# Patient Record
Sex: MALE | URBAN - METROPOLITAN AREA
[De-identification: names, ages, dates, MRNs, and addresses within clinical notes are randomized per-mention and may not be internally consistent; named-entity substitution may affect disease eponyms.]

---

## 2020-02-10 ENCOUNTER — APPOINTMENT (OUTPATIENT)
Age: 70
Setting detail: DERMATOLOGY
End: 2020-02-11

## 2020-02-10 DIAGNOSIS — Z41.9 ENCOUNTER FOR PROCEDURE FOR PURPOSES OTHER THAN REMEDYING HEALTH STATE, UNSPECIFIED: ICD-10-CM

## 2020-02-10 PROCEDURE — OTHER FILLERS (CC): OTHER

## 2020-02-10 NOTE — PROCEDURE: FILLERS (CC)
Price (Use Numbers Only, No Special Characters Or $): 581 Price (Use Numbers Only, No Special Characters Or $): 021

## 2020-02-24 ENCOUNTER — APPOINTMENT (OUTPATIENT)
Age: 70
Setting detail: DERMATOLOGY
End: 2020-02-24

## 2020-02-24 DIAGNOSIS — Z41.9 ENCOUNTER FOR PROCEDURE FOR PURPOSES OTHER THAN REMEDYING HEALTH STATE, UNSPECIFIED: ICD-10-CM

## 2020-02-24 PROCEDURE — OTHER CLEAR AND BRILLIANT LASER: OTHER

## 2020-02-24 ASSESSMENT — LOCATION ZONE DERM: LOCATION ZONE: FACE

## 2020-02-24 ASSESSMENT — LOCATION DETAILED DESCRIPTION DERM: LOCATION DETAILED: LEFT INFERIOR MEDIAL FOREHEAD

## 2020-02-24 ASSESSMENT — LOCATION SIMPLE DESCRIPTION DERM: LOCATION SIMPLE: LEFT FOREHEAD

## 2020-02-24 NOTE — PROCEDURE: CLEAR AND BRILLIANT LASER
Price (Use Numbers Only, No Special Characters Or $): 327 Price (Use Numbers Only, No Special Characters Or $): 400

## 2020-02-24 NOTE — PROCEDURE: CLEAR AND BRILLIANT LASER
Post-Procedure Care: Medical barrier and ice applied.  SPF Post care reviewed with patient. Gentle cleanser Moisterizer Epionce SPF daily also firming mask at pm pt using  hydrate him and Epionce  post kit

## 2020-03-16 ENCOUNTER — APPOINTMENT (OUTPATIENT)
Age: 70
Setting detail: DERMATOLOGY
End: 2020-03-16

## 2020-03-16 DIAGNOSIS — Z41.9 ENCOUNTER FOR PROCEDURE FOR PURPOSES OTHER THAN REMEDYING HEALTH STATE, UNSPECIFIED: ICD-10-CM

## 2020-03-16 PROCEDURE — OTHER JUVEDERM ULTRA PLUS INJECTION: OTHER

## 2020-03-16 PROCEDURE — OTHER PALOMAR ICON LASER: OTHER

## 2020-03-16 PROCEDURE — OTHER BOTOX (U OR CC): OTHER

## 2020-03-16 ASSESSMENT — LOCATION SIMPLE DESCRIPTION DERM: LOCATION SIMPLE: LEFT CHEEK

## 2020-03-16 ASSESSMENT — LOCATION ZONE DERM: LOCATION ZONE: FACE

## 2020-03-16 ASSESSMENT — LOCATION DETAILED DESCRIPTION DERM: LOCATION DETAILED: LEFT LATERAL MALAR CHEEK

## 2020-03-16 NOTE — PROCEDURE: JUVEDERM ULTRA PLUS INJECTION
Price (Use Numbers Only, No Special Characters Or $): 363 Price (Use Numbers Only, No Special Characters Or $): 394

## 2020-03-16 NOTE — PROCEDURE: PALOMAR ICON LASER
Fluence: 38
Pulse Duration (In Milliseconds): 15
Treated Area: small area
Fluence Units: J/cm2
Number Of Passes: 1
Consent: Written consent obtained, risks reviewed including but not limited to crusting, scabbing, blistering, scarring, darker or lighter pigmentary change, bruising, and/or incomplete response.
Render Post Care In The Note?: yes
Detail Level: Zone
Anesthesia Type: 1% lidocaine with epinephrine
Fluence: 0
Post-Care Instructions: I reviewed with the patient in detail post-care instructions. Patient should stay away from the sun and wear sun protection until treated areas are fully healed. Medical barrier cream for protection and healing also Elta SPF
Pre-Procedure Text: The patient's skin was cleaned and ultrasound gel applied.
Price (Use Numbers Only, No Special Characters Or $): 95
External Cooling Fan Speed: 5
Location: dorsal hands

## 2021-07-14 ENCOUNTER — APPOINTMENT (OUTPATIENT)
Dept: URBAN - METROPOLITAN AREA CLINIC 273 | Age: 71
Setting detail: DERMATOLOGY
End: 2021-07-14

## 2021-07-14 DIAGNOSIS — Z41.9 ENCOUNTER FOR PROCEDURE FOR PURPOSES OTHER THAN REMEDYING HEALTH STATE, UNSPECIFIED: ICD-10-CM

## 2021-07-14 PROCEDURE — OTHER BOTOX (U OR CC): OTHER

## 2021-07-14 PROCEDURE — OTHER JUVEDERM VOLUMA XC INJECTION: OTHER

## 2021-07-14 PROCEDURE — OTHER JUVEDERM ULTRA PLUS INJECTION: OTHER

## 2022-02-09 ENCOUNTER — APPOINTMENT (OUTPATIENT)
Dept: URBAN - METROPOLITAN AREA CLINIC 273 | Age: 72
Setting detail: DERMATOLOGY
End: 2022-02-09

## 2022-02-09 DIAGNOSIS — Z41.9 ENCOUNTER FOR PROCEDURE FOR PURPOSES OTHER THAN REMEDYING HEALTH STATE, UNSPECIFIED: ICD-10-CM

## 2022-02-09 PROCEDURE — OTHER JUVEDERM VOLUMA XC INJECTION: OTHER

## 2022-02-09 PROCEDURE — OTHER JUVEDERM ULTRA PLUS INJECTION: OTHER

## 2023-05-10 ENCOUNTER — APPOINTMENT (OUTPATIENT)
Dept: URBAN - METROPOLITAN AREA CLINIC 273 | Age: 73
Setting detail: DERMATOLOGY
End: 2023-05-10

## 2023-05-10 DIAGNOSIS — Z41.9 ENCOUNTER FOR PROCEDURE FOR PURPOSES OTHER THAN REMEDYING HEALTH STATE, UNSPECIFIED: ICD-10-CM

## 2023-05-10 PROCEDURE — OTHER JUVEDERM VOLUMA XC INJECTION: OTHER

## 2023-05-10 PROCEDURE — OTHER BOTOX (U OR CC): OTHER
